# Patient Record
Sex: FEMALE | Race: WHITE
[De-identification: names, ages, dates, MRNs, and addresses within clinical notes are randomized per-mention and may not be internally consistent; named-entity substitution may affect disease eponyms.]

---

## 2017-10-15 ENCOUNTER — HEALTH MAINTENANCE LETTER (OUTPATIENT)
Age: 47
End: 2017-10-15

## 2021-06-07 VITALS — WEIGHT: 160 LBS | BODY MASS INDEX: 25.71 KG/M2 | HEIGHT: 66 IN

## 2021-06-07 ASSESSMENT — MIFFLIN-ST. JEOR: SCORE: 1367.51

## 2021-06-07 NOTE — PATIENT INSTRUCTIONS
You have symptoms of insomnia and would likely benefit from non-medication approaches to treatment.  Cognitive behavioral treatment (CBT) for insomnia is a very effective technique that involves changing your behaviors and thoughts associated with sleep.  People that are motivated to make changes in their sleep pattern are likely to benefit from self-help strategies for treatment of insomnia.      Several treatment books for insomnia are recommended for your consideration.These resources are intended to guide you through a process of change that may take 4-6 weeks to complete.    Suggested Resource Insomnia Treatment Books     Overcoming Insomnia:  A Cognitive Behavioral Therapy  Approach Workbook by Arley Wolf and Felicity Pena (2008)      Say [place]ona to Insomnia by Enrique Thomson (2009)      The Insomnia Workbook by Mary Montague and Chandra Hummel (2009)       Quiet Your Mind and Get to Sleep:  Solutions to Insomnia for Those with Depression, Anxiety or Chronic Pain by Felicity Pena and Maryellen Mckinney (2009)        Your BMI is Body mass index is 25.82 kg/m .  Weight management is a personal decision.  If you are interested in exploring weight loss strategies, the following discussion covers the approaches that may be successful. Body mass index (BMI) is one way to tell whether you are at a healthy weight, overweight, or obese. It measures your weight in relation to your height.  A BMI of 18.5 to 24.9 is in the healthy range. A person with a BMI of 25 to 29.9 is considered overweight, and someone with a BMI of 30 or greater is considered obese. More than two-thirds of American adults are considered overweight or obese.  Being overweight or obese increases the risk for further weight gain. Excess weight may lead to heart disease and diabetes.  Creating and following plans for healthy eating and physical activity may help you improve your health.  Weight control is part of healthy lifestyle and  includes exercise, emotional health, and healthy eating habits. Careful eating habits lifelong are the mainstay of weight control. Though there are significant health benefits from weight loss, long-term weight loss with diet alone may be very difficult to achieve- studies show long-term success with dietary management in less than 10% of people. Attaining a healthy weight may be especially difficult to achieve in those with severe obesity. In some cases, medications, devices and surgical management might be considered.  What can you do?  If you are overweight or obese and are interested in methods for weight loss, you should discuss this with your provider.     Consider reducing daily calorie intake by 500 calories.     Keep a food journal.     Avoiding skipping meals, consider cutting portions instead.    Diet combined with exercise helps maintain muscle while optimizing fat loss. Strength training is particularly important for building and maintaining muscle mass. Exercise helps reduce stress, increase energy, and improves fitness. Increasing exercise without diet control, however, may not burn enough calories to loose weight.       Start walking three days a week 10-20 minutes at a time    Work towards walking thirty minutes five days a week     Eventually, increase the speed of your walking for 1-2 minutes at time    In addition, we recommend that you review healthy lifestyles and methods for weight loss available through the National Institutes of Health patient information sites:  http://win.niddk.nih.gov/publications/index.htm    And look into health and wellness programs that may be available through your health insurance provider, employer, local community center, or mari club.    Weight management plan: Patient was referred to their PCP to discuss a diet and exercise plan.

## 2021-06-07 NOTE — PROGRESS NOTES
Gwendolyn Bernal is a 50 year old who is being evaluated via a billable video visit.      How would you like to obtain your AVS? MyChart  If the video visit is dropped, the invitation should be resent by: Send to e-mail at: [email or web address]  Will anyone else be joining your video visit? No    Does patient have any form of state insurance?No   Do you have wifi? Yes  Do you have a smart phone?Yes  Can you download an chong on your phone comfortably with out assistance? Yes  Can you watch a Youtube video? Yes        Haseeb Shaw MA    Video Start Time: 9:15 AM  Video-Visit Details    Type of service:  Video Visit    Video End Time:10:10 AM    Originating Location (pt. Location): Home    Distant Location (provider location):  [email or web address]@     Platform used for Video Visit: [facility]      Sleep Consultation:    Date on this visit: 6/10/2021    Gwendolyn Bernal is sent by No ref. provider found for a sleep consultation regarding insomnia.    Primary Physician: Gina Palm     Gwendolyn Bernal reports frequent difficulty falling asleep and poor quality of sleep for last 2 months.     About 3 months ago, patient had acute onset of tinnitus, which caused significant worry and sleep disturbance.  Although tinnitus resolved after 1 to 2 months, she started having significant insomnia that persisted.  She reports having sleep initiation and maintenance difficulty.  Patient reports having developed significant fear and anxiety about her insomnia.  At baseline, she admits that she has increased health-related anxiety.  She reports significant dread about being awake during the night and worry about lack of sleep. Currently,  anxiety is better and she says that she has come to some kind of terms with her insomnia.     She read extensively about insomnia and watched videos on YouTube about insomnia.  She has tried many behavioral interventions, restriction of screen time, regular scheduling of sleep  wake times and sleep restriction.  She has been able to calm down some of her anxiety about consequences of insomnia for the next day.     Patient works as director for research at [employer] and lives with her  and two teenage children.     Gwendolyn goes to sleep at 11:30 PM during the week. She wakes up at 6:30 AM without an alarm. She falls asleep in 20 minutes on a good night.  However, she reports that there may nights when she was lying awake for long periods of time without falling asleep. Gwendolyn has difficulty falling asleep.  She wakes up 1-2 times a night for 15 minutes before falling back to sleep.  Gwendolyn wakes up to uncertain reasons.  On weekends, Gwendolyn goes to sleep at 11:30 PM.  She wakes up at 6:30 AM without an alarm. She falls asleep in 30 minutes.  Patient gets an average of 5 hours of sleep per night.     Gwendolyn does not snore. Patient does have a regular bed partner. There is not report of gasping.  She does not have witnessed apneas. They never sleep separately.  Patient sleeps on her back and side. She denies no morning headaches and restless legs. Gwendolyn denies any bruxism, sleep walking, sleep talking, dream enactment, sleep paralysis, cataplexy and hypnogogic/hypnopompic hallucinations.    Patient's [place] Sleepiness score 5/24 consistent with no daytime sleepiness.      Gwendolyn naps 0 times per week. She takes no inadvertant naps.  She denies closing eyes, dozing and falling asleep while driving. Patient was counseled on the importance of driving while alert, to pull over if drowsy, or nap before getting into the vehicle if sleepy.      She uses 2 cups/day of coffee. Last caffeine intake is usually before noon.    Allergies:    No Known Allergies    Medications:    Current Outpatient Medications   Medication Sig Dispense Refill     desogestrel-ethinyl estradiol (APRI) 0.15-30 MG-MCG per tablet Take 1 tablet by mouth daily.       NONFORMULARY Iodine/Iodide plus 12.5 mg  Thyroid natural product         tobramycin (TOBREX) 0.3 % ophthalmic solution Place 1 drop Into the left eye 3 times daily. 1 Bottle 0       Problem List:  There are no active problems to display for this patient.       Past Medical/Surgical History:  No past medical history on file.  No past surgical history on file.    Social History:  Social History     Socioeconomic History     Marital status:      Spouse name: Not on file     Number of children: Not on file     Years of education: Not on file     Highest education level: Not on file   Occupational History     Not on file   Social Needs     Financial resource strain: Not on file     Food insecurity     Worry: Not on file     Inability: Not on file     Transportation needs     Medical: Not on file     Non-medical: Not on file   Tobacco Use     Smoking status: Never Smoker     Smokeless tobacco: Never Used   Substance and Sexual Activity     Alcohol use: Yes     Comment: occassionally     Drug use: No     Sexual activity: Yes   Lifestyle     Physical activity     Days per week: Not on file     Minutes per session: Not on file     Stress: Not on file   Relationships     Social connections     Talks on phone: Not on file     Gets together: Not on file     Attends Sabianism service: Not on file     Active member of club or organization: Not on file     Attends meetings of clubs or organizations: Not on file     Relationship status: Not on file     Intimate partner violence     Fear of current or ex partner: Not on file     Emotionally abused: Not on file     Physically abused: Not on file     Forced sexual activity: Not on file   Other Topics Concern     Parent/sibling w/ CABG, MI or angioplasty before 65F 55M? Not Asked   Social History Narrative     Not on file       Family History:  Family History   Problem Relation Age of Onset     Cancer - colorectal Maternal Grandmother      Arthritis Maternal Grandmother      Prostate Cancer Maternal Grandfather       "Cardiovascular Maternal Grandfather      Diabetes Paternal Grandfather      Hypertension Paternal Grandfather      Cardiovascular Paternal Grandfather      Heart Disease Paternal Grandfather      Obesity Paternal Grandfather        Review of Systems:  A complete review of systems reviewed by me is negative with the exeption of what has been mentioned in the history of present illness.  CONSTITUTIONAL: NEGATIVE for weight gain/loss, fever, chills, sweats or night sweats, drug allergies.  EYES: NEGATIVE for changes in vision, blind spots, double vision.  ENT: NEGATIVE for ear pain, sore throat, sinus pain, post-nasal drip, runny nose, bloody nose  CARDIAC: NEGATIVE for fast heartbeats or fluttering in chest, chest pain or pressure, breathlessness when lying flat, swollen legs or swollen feet.  NEUROLOGIC: NEGATIVE headaches, weakness or numbness in the arms or legs.  DERMATOLOGIC: NEGATIVE for rashes, new moles or change in mole(s)  PULMONARY: NEGATIVE SOB at rest, SOB with activity, dry cough, productive cough, coughing up blood, wheezing or whistling when breathing.    GASTROINTESTINAL: NEGATIVE for nausea or vomitting, loose or watery stools, fat or grease in stools, constipation, abdominal pain, bowel movements black in color or blood noted.  GENITOURINARY: NEGATIVE for pain during urination, blood in urine, urinating more frequently than usual, irregular menstrual periods.  MUSCULOSKELETAL: NEGATIVE for muscle pain, bone or joint pain, swollen joints.  ENDOCRINE: NEGATIVE for increased thirst or urination, diabetes.  LYMPHATIC: NEGATIVE for swollen lymph nodes, lumps or bumps in the breasts or nipple discharge.    Physical Examination:  Vitals: Ht 1.676 m (5' 6\")   Wt 72.6 kg (160 lb)   BMI 25.82 kg/m    BMI= Body mass index is 25.82 kg/m .         Devon Total Score 6/7/2021   Total score - Devon 5       HUMA Total Score: 15 (06/07/21 1100)    GENERAL APPEARANCE: healthy and alert  RESP: Negative for cough " or dyspnea   NEURO: mentation intact and speech normal  PSYCH: anxious and tearful when discussing insomnia     Impression/Plan:    1. Insomnia, psychophysiologic     Patient presents with acute insomnia for last 2 months, which was precipitated by sleep disturbance and anxiety related to tinnitus. Significant hyperarousal, sleep-related anxiety and, worry about consequences of insomnia is reported. Patient wants non-pharmacological management of insomnia and cognitive behavioral therapy for insomnia is advised. She seems to have a good understanding of behavioral modifications required for insomnia management and work may need to be targeted at cognitive therapy. Efforts to initiate sleep and lack of success seems to trigger significant anxiety and frustration. Paradoxical intention could be helpful and patient will try this. I will follow up in a few weeks to monitor progress.     Plan:     1. Referral to Behavioral sleep medicine for insomnia     I spent a total of 60 minutes for this appointment today which include time spent before, during and after the visit for patient care, counseling and coordination of care.    Dr. Eddy Velasquez     CC: No ref. provider found

## 2021-06-10 ENCOUNTER — VIRTUAL VISIT (OUTPATIENT)
Dept: SLEEP MEDICINE | Facility: CLINIC | Age: 51
End: 2021-06-10
Payer: COMMERCIAL

## 2021-06-10 DIAGNOSIS — F51.04 INSOMNIA, PSYCHOPHYSIOLOGICAL: Primary | ICD-10-CM

## 2021-06-10 PROCEDURE — 99205 OFFICE O/P NEW HI 60 MIN: CPT | Mod: GT | Performed by: INTERNAL MEDICINE

## 2021-06-10 NOTE — NURSING NOTE
Patient's information has been sent to Insomnia Team to call pt to schedule CBTI appointment.    USAMA Pizano

## 2021-08-01 ENCOUNTER — HEALTH MAINTENANCE LETTER (OUTPATIENT)
Age: 51
End: 2021-08-01

## 2021-09-26 ENCOUNTER — HEALTH MAINTENANCE LETTER (OUTPATIENT)
Age: 51
End: 2021-09-26

## 2022-08-28 ENCOUNTER — HEALTH MAINTENANCE LETTER (OUTPATIENT)
Age: 52
End: 2022-08-28

## 2023-04-23 ENCOUNTER — HEALTH MAINTENANCE LETTER (OUTPATIENT)
Age: 53
End: 2023-04-23

## 2023-09-24 ENCOUNTER — HEALTH MAINTENANCE LETTER (OUTPATIENT)
Age: 53
End: 2023-09-24